# Patient Record
Sex: FEMALE | Race: WHITE
[De-identification: names, ages, dates, MRNs, and addresses within clinical notes are randomized per-mention and may not be internally consistent; named-entity substitution may affect disease eponyms.]

---

## 2023-04-13 ENCOUNTER — HOSPITAL ENCOUNTER (EMERGENCY)
Dept: HOSPITAL 47 - EC | Age: 83
Discharge: HOME | End: 2023-04-13
Payer: MEDICARE

## 2023-04-13 VITALS — RESPIRATION RATE: 18 BRPM | DIASTOLIC BLOOD PRESSURE: 95 MMHG | HEART RATE: 74 BPM | SYSTOLIC BLOOD PRESSURE: 146 MMHG

## 2023-04-13 VITALS — TEMPERATURE: 97.1 F

## 2023-04-13 DIAGNOSIS — K40.90: ICD-10-CM

## 2023-04-13 DIAGNOSIS — N39.0: Primary | ICD-10-CM

## 2023-04-13 LAB
ALBUMIN SERPL-MCNC: 4.5 G/DL (ref 3.5–5)
ALP SERPL-CCNC: 72 U/L (ref 38–126)
ALT SERPL-CCNC: 22 U/L (ref 4–34)
ANION GAP SERPL CALC-SCNC: 11 MMOL/L
APTT BLD: 23 SEC (ref 22–30)
AST SERPL-CCNC: 25 U/L (ref 14–36)
BASOPHILS # BLD AUTO: 0 K/UL (ref 0–0.2)
BASOPHILS NFR BLD AUTO: 0 %
BUN SERPL-SCNC: 11 MG/DL (ref 7–17)
CALCIUM SPEC-MCNC: 9.9 MG/DL (ref 8.4–10.2)
CHLORIDE SERPL-SCNC: 107 MMOL/L (ref 98–107)
CO2 SERPL-SCNC: 23 MMOL/L (ref 22–30)
EOSINOPHIL # BLD AUTO: 0 K/UL (ref 0–0.7)
EOSINOPHIL NFR BLD AUTO: 1 %
ERYTHROCYTE [DISTWIDTH] IN BLOOD BY AUTOMATED COUNT: 4.82 M/UL (ref 3.8–5.4)
ERYTHROCYTE [DISTWIDTH] IN BLOOD: 12.5 % (ref 11.5–15.5)
GLUCOSE SERPL-MCNC: 112 MG/DL (ref 74–99)
HCT VFR BLD AUTO: 43 % (ref 34–46)
HGB BLD-MCNC: 14.8 GM/DL (ref 11.4–16)
INR PPP: 1.1 (ref ?–1.2)
KETONES UR QL STRIP.AUTO: (no result)
LYMPHOCYTES # SPEC AUTO: 0.7 K/UL (ref 1–4.8)
LYMPHOCYTES NFR SPEC AUTO: 9 %
MAGNESIUM SPEC-SCNC: 2.1 MG/DL (ref 1.6–2.3)
MCH RBC QN AUTO: 30.8 PG (ref 25–35)
MCHC RBC AUTO-ENTMCNC: 34.5 G/DL (ref 31–37)
MCV RBC AUTO: 89.3 FL (ref 80–100)
MONOCYTES # BLD AUTO: 0.4 K/UL (ref 0–1)
MONOCYTES NFR BLD AUTO: 5 %
NEUTROPHILS # BLD AUTO: 6.5 K/UL (ref 1.3–7.7)
NEUTROPHILS NFR BLD AUTO: 84 %
PH UR: 8 [PH] (ref 5–8)
PLATELET # BLD AUTO: 200 K/UL (ref 150–450)
POTASSIUM SERPL-SCNC: 3.9 MMOL/L (ref 3.5–5.1)
PROT SERPL-MCNC: 7.6 G/DL (ref 6.3–8.2)
PT BLD: 11.2 SEC (ref 9–12)
RBC UR QL: 6 /HPF (ref 0–5)
SODIUM SERPL-SCNC: 141 MMOL/L (ref 137–145)
SP GR UR: 1.01 (ref 1–1.03)
SQUAMOUS UR QL AUTO: 16 /HPF (ref 0–4)
UROBILINOGEN UR QL STRIP: <2 MG/DL (ref ?–2)
WBC # BLD AUTO: 7.8 K/UL (ref 3.8–10.6)
WBC #/AREA URNS HPF: >182 /HPF (ref 0–5)

## 2023-04-13 PROCEDURE — 85610 PROTHROMBIN TIME: CPT

## 2023-04-13 PROCEDURE — 85730 THROMBOPLASTIN TIME PARTIAL: CPT

## 2023-04-13 PROCEDURE — 80053 COMPREHEN METABOLIC PANEL: CPT

## 2023-04-13 PROCEDURE — 83735 ASSAY OF MAGNESIUM: CPT

## 2023-04-13 PROCEDURE — 83605 ASSAY OF LACTIC ACID: CPT

## 2023-04-13 PROCEDURE — 96374 THER/PROPH/DIAG INJ IV PUSH: CPT

## 2023-04-13 PROCEDURE — 84484 ASSAY OF TROPONIN QUANT: CPT

## 2023-04-13 PROCEDURE — 85025 COMPLETE CBC W/AUTO DIFF WBC: CPT

## 2023-04-13 PROCEDURE — 93005 ELECTROCARDIOGRAM TRACING: CPT

## 2023-04-13 PROCEDURE — 36415 COLL VENOUS BLD VENIPUNCTURE: CPT

## 2023-04-13 PROCEDURE — 74177 CT ABD & PELVIS W/CONTRAST: CPT

## 2023-04-13 PROCEDURE — 99285 EMERGENCY DEPT VISIT HI MDM: CPT

## 2023-04-13 PROCEDURE — 81001 URINALYSIS AUTO W/SCOPE: CPT

## 2023-04-13 PROCEDURE — 71046 X-RAY EXAM CHEST 2 VIEWS: CPT

## 2023-04-13 RX ADMIN — CEFTRIAXONE SODIUM STA MG: 1 INJECTION, POWDER, FOR SOLUTION INTRAMUSCULAR; INTRAVENOUS at 17:36

## 2023-04-13 NOTE — CT
EXAMINATION TYPE: CT abdomen pelvis w con

 

DATE OF EXAM: 4/13/2023

 

COMPARISON: None

 

HISTORY: pt states she is here for SOB

 

CT DLP: 1062.8 mGycm

 

CONTRAST: 

CT scan of the abdomen and pelvis is performed without Oral Contrast and with IV Contrast, patient in
jected with 80cc mL of Isovue 300.

 

FINDINGS: 

LUNG BASES-: No visible nodule.  No infiltrate. There is evidence of cardiomegaly.

 

LIVER/GB:   No calcified gallstones.  No space occupying hepatic lesion. Biliary tree is of normal ca
liber. 

 

PANCREAS:  No inflammation.  No distinct mass. 

 

SPLEEN:  No splenic enlargement.  No lesion seen. 

 

ADRENALS:  No nodule.  No thickening. 

 

KIDNEYS/BLADDER:  No hydronephrosis.  No nephrolithiasis.  No distinct renal mass.  Urinary bladder g
rossly unremarkable. 

 

BOWEL: Normal appendix.  Normal bowel caliber.  No inflammation. Scattered diverticulosis of the colo
n without diverticulitis.

 

GENITAL ORGANS:  No gross abnormality. 

 

LYMPH NODES: Enlarged left inguinal lymph node measuring nearly 2 cm short axis. AORTA: No significan
t abnormality. 

 

OSSEOUS STRUCTURES:  No significant abnormality is seen. 

 

OTHER: Right inguinal hernia containing fat and fluid measures 3.6 cm in length.

 

IMPRESSION: 

1. Right inguinal hernia containing fat and fluid measures 3.6 cm in length.

2. Left inguinal adenopathy.

## 2023-04-13 NOTE — XR
EXAMINATION TYPE: XR chest 2V

 

DATE OF EXAM: 4/13/2023

 

COMPARISON: NONE

 

HISTORY: Shortness of breath

 

TECHNIQUE:  Frontal and lateral views of the chest are obtained.

 

FINDINGS:

 

Scattered senescent parenchymal changes noted. Hyperinflation compatible with COPD. 

 

No evidence for infiltrate. No evidence for atelectasis.

 

Heart size is stable.

 

Mediastinal structures are stable and grossly unremarkable.

 

No evidence for hilar prominence.

 

Degenerative changes dorsal spine. 

 

IMPRESSION:

1. No evidence for acute pulmonary disease.

## 2023-04-13 NOTE — ED
General Adult HPI





- General


Chief complaint: Shortness of Breath


Stated complaint: SOB


Time Seen by Provider: 04/13/23 14:20


Source: patient, EMS, RN notes reviewed, old records reviewed


Mode of arrival: EMS


Limitations: no limitations





- History of Present Illness


Initial comments: 





This is a 82-year-old female presents emergency Department with some minor 

dementia but she is not a very good historian fell whole history comes from the 

daughter and .  Patient went to an urgent care this morning she was 

complaining of some right lower quadrant abdominal pain and some shortness of 

breath.  When they got there it was determined that she needs to come to the 

emergency department I did not receive any phone call as to why she was coming. 

Patient complains now of having area that bulges out her abdomen hurts to touch.

 Patient also complains of having urinary incontinence.  Patient denies any 

fever chills or cough recently.  Patient denies any chest pain or palpitations. 

 states she initially complained of shortness of breath but she is not 

complaining of that currently.  Daughter states that the patient has been more 

and more incontinent of urine over the last month or 2 he has been wearing pads 

because of that today was a little excessive.





- Related Data


                                Home Medications











 Medication  Instructions  Recorded  Confirmed


 


Spironolactone [Aldactone] 25 mg PO DAILY 04/13/23 04/13/23








                                  Previous Rx's











 Medication  Instructions  Recorded


 


Sulfamethox-Tmp 800-160Mg [Bactrim 1 each PO Q12HR #14 tab 04/13/23





-160 mg]  











                                    Allergies











Allergy/AdvReac Type Severity Reaction Status Date / Time


 


No Known Allergies Allergy   Verified 04/13/23 16:25














Review of Systems


ROS Statement: 


Those systems with pertinent positive or pertinent negative responses have been 

documented in the HPI.





ROS Other: All systems not noted in ROS Statement are negative.





Past Medical History


Past Medical History: Unable to Obtain


History of Any Multi-Drug Resistant Organisms: None Reported


Past Surgical History: Unable to Obtain


Past Psychological History: No Psychological Hx Reported


Smoking Status: Never smoker


Past Alcohol Use History: Occasional


Past Drug Use History: None Reported





General Exam





- General Exam Comments


Initial Comments: 





GENERAL:


Patient is well-developed and well-nourished.  Patient is nontoxic and well-h

ydrated and is in mild distress.





ENT:


Neck is soft and supple.  No significant lymphadenopathy is noted.  Oropharynx 

is clear.  Moist mucous membranes.  Neck has full range of motion without 

eliciting any pain.  





EYES:


The sclera were anicteric and conjunctiva were pink and moist.  Extraocular mov

ements were intact and pupils were equal round and reactive to light.  Eyelids 

were unremarkable.





PULMONARY:


Unlabored respirations.  Good breath sounds bilaterally.  No audible rales 

rhonchi or wheezing was noted.





CARDIOVASCULAR:


Patient has irregularly irregular heartbeat





ABDOMEN:


Soft and nontender with normal bowel sounds.  Patient has what appears to be a 

ventral hernia on the right suprapubic region it does appear to be somewhat 

reducible but it is very tender.  Patient is incontinent of urine





SKIN:


Skin is clear with no lesions or rashes and otherwise unremarkable.





NEUROLOGIC:


Patient is alert and oriented x3.  Cranial nerves II through XII are grossly 

intact.  Motor and sensory are also intact.  Normal speech, volume and content. 

Symmetrical smile.  





MUSCULOSKELETAL:


Normal extremities with adequate strength and full range of motion.  





LYMPHATICS:


No significant lymphadenopathy is noted





PSYCHIATRIC:


Normal psychiatric evaluation.  


Limitations: no limitations





Course


                                   Vital Signs











  04/13/23 04/13/23





  14:23 17:33


 


Temperature 97.1 F L 


 


Pulse Rate 122 H 74


 


Respiratory 24 18





Rate  


 


Blood Pressure 172/106 146/95


 


O2 Sat by Pulse 99 97





Oximetry  














Medical Decision Making





- Medical Decision Making





EKG is interpreted by myself.  EKG shows atrial fibrillation at 94 bpm QRS is 93

QT interval 353 QTC is 404.  Patient's EKG shows no ST segment elevation.





Was pt. sent in by a medical professional or institution ([facility], NP, urgent 

care, hospital, or nursing home...) When possible be specific


@  -Patient was sent to us by urgent care


Did you speak to anyone other than the patient for history (EMS, parent, family,

police, friend...)? What history was obtained from this source 


@  -Gave most of the reliable history


Did you review nursing and triage notes (agree or disagree)?  Why? 


@  -I reviewed and agree with nursing and triage notes


Were old charts reviewed (outside hosp., previous admission, EMS record, old 

EKG, old radiological studies, urgent care reports/EKG's, nursing home records)?

Report findings 


@  -I reviewed prior laboratory results on this patient


Differential Diagnosis (chest pain, altered mental status, abdominal pain women,

abdominal pain men, vaginal bleeding, weakness, fever, dyspnea, syncope, 

headache, dizziness, GI bleed, back pain, seizure, CVA, palpatations, mental 

health, musculoskeletal)? 


@  -Differential Abdominal Pain Women:


Appendicitis, Cholecystitis, diverticulosis, ischemic bowel, pancreatitis, hepat

itis, UTI, gastroenteritis, AAA, incarcerated hernia, bowel obstruction, 

constipation, inflammatory bowel, hepatitis, peptic ulcer disease, splenic 

infarction, perforated viscus, vulvitis, ovarian torsion, PID, kidney stone, 

placenta abruption, this is not meant to be an all-inclusive list





EKG interpreted by me (3pts min.).


@  -As above


X-rays interpreted by me (1pt min.).


@  -Chest x-ray was interpreted by myself I no acute abnormality


CT interpreted by me (1pt min.).


@  -CT of the abdomen and pelvis was interpreted by myself.  It showed an 

inguinal hernia with some protruding fat no intestine was involved 


U/S interpreted by me (1pt. min.).


@  -None done


What testing was considered but not performed or refused? (CT, X-rays, U/S, 

labs)? Why?


@  -None


What meds were considered but not given or refused? Why?


@  -None


Did you discuss the management of the patient with other professionals 

(professionals i.e. , PA, NP, lab, RT, psych nurse, , , 

teacher, , )? Give summary


@  -No


Was smoking cessation discussed for >3mins.?


@  -No


Was critical care preformed (if so, how long)?


@  -No


Were there social determinants of health that impacted care today? How? 

(Homelessness, low income, unemployed, alcoholism, drug addiction, 

transportation, low edu. Level, literacy, decrease access to med. care, [facility], 

rehab)?


@  -No


Was there de-escalation of care discussed even if they declined (Discuss DNR or 

withdrawal of care, Hospice)? DNR status


@  -No


What co-morbidities impacted this encounter? (DM, HTN, Smoking, COPD, CAD, 

Cancer, CVA, ARF, Chemo, Hep., AIDS, mental health diagnosis, sleep apnea, 

morbid obesity)?


@  -None


Was patient admitted / discharged? Hospital course, mention meds given and 

route, prescriptions, significant lab abnormalities, going to OR and other 

pertinent info.


@  -Patient did have an inguinal hernia and it was reducible.  Patient had a 

urinary tract infection I gave her 2 g of Rocephin in the hospital and she'll be

discharged home with antibiotics


Undiagnosed new problem with uncertain prognosis?


@  -No


Drug Therapy requiring intensive monitoring for toxicity (Heparin, Nitro, 

Insulin, Cardizem)?


@  -No


Were any procedures done?


@  -No


Diagnosis/symptom?


@  -Urinary tract infection


Acute, or Chronic, or Acute on Chronic?


@  -Acute


Uncomplicated (without systemic symptoms) or Complicated (systemic symptoms)?


@  -Complicated


Side effects of treatment?


@  -No


Exacerbation, Progression, or Severe Exacerbation?


@  -No


Poses a threat to life or bodily function? How? (Chest pain, USA, MI, pneumonia,

PE, COPD, DKA, ARF, appy, cholecystitis, CVA, Diverticulitis, Homicidal, 

Suicidal, threat to staff... and all critical care pts)


@  -Yes this could lead to sepsis and in no organ dysfunction


Diagnosis/symptom?


@  -Inguinal hernia reducible


Acute, or Chronic, or Acute on Chronic?


@  -Acute on chronic


Uncomplicated (without systemic symptoms) or Complicated (systemic symptoms)?


@  -Uncomplicated


Side effects of treatment?


@  -none


Exacerbation, Progression, or Severe Exacerbation]


@  -no


Poses a threat to life or bodily function?


@  -no





- Lab Data


Result diagrams: 


                                 04/13/23 15:13





                                 04/13/23 15:13


                                   Lab Results











  04/13/23 04/13/23 04/13/23 Range/Units





  15:13 15:13 15:13 


 


WBC  7.8    (3.8-10.6)  k/uL


 


RBC  4.82    (3.80-5.40)  m/uL


 


Hgb  14.8    (11.4-16.0)  gm/dL


 


Hct  43.0    (34.0-46.0)  %


 


MCV  89.3    (80.0-100.0)  fL


 


MCH  30.8    (25.0-35.0)  pg


 


MCHC  34.5    (31.0-37.0)  g/dL


 


RDW  12.5    (11.5-15.5)  %


 


Plt Count  200    (150-450)  k/uL


 


MPV  6.9    


 


Neutrophils %  84    %


 


Lymphocytes %  9    %


 


Monocytes %  5    %


 


Eosinophils %  1    %


 


Basophils %  0    %


 


Neutrophils #  6.5    (1.3-7.7)  k/uL


 


Lymphocytes #  0.7 L    (1.0-4.8)  k/uL


 


Monocytes #  0.4    (0-1.0)  k/uL


 


Eosinophils #  0.0    (0-0.7)  k/uL


 


Basophils #  0.0    (0-0.2)  k/uL


 


PT   11.2   (9.0-12.0)  sec


 


INR   1.1   (<1.2)  


 


APTT   23.0   (22.0-30.0)  sec


 


Sodium    141  (137-145)  mmol/L


 


Potassium    3.9  (3.5-5.1)  mmol/L


 


Chloride    107  ()  mmol/L


 


Carbon Dioxide    23  (22-30)  mmol/L


 


Anion Gap    11  mmol/L


 


BUN    11  (7-17)  mg/dL


 


Creatinine    0.63  (0.52-1.04)  mg/dL


 


Est GFR (CKD-EPI)AfAm    >90  (>60 ml/min/1.73 sqM)  


 


Est GFR (CKD-EPI)NonAf    84  (>60 ml/min/1.73 sqM)  


 


Glucose    112 H  (74-99)  mg/dL


 


Plasma Lactic Acid Cassius     (0.7-2.0)  mmol/L


 


Calcium    9.9  (8.4-10.2)  mg/dL


 


Magnesium    2.1  (1.6-2.3)  mg/dL


 


Total Bilirubin    1.7 H  (0.2-1.3)  mg/dL


 


AST    25  (14-36)  U/L


 


ALT    22  (4-34)  U/L


 


Alkaline Phosphatase    72  ()  U/L


 


Troponin I     (0.000-0.034)  ng/mL


 


Total Protein    7.6  (6.3-8.2)  g/dL


 


Albumin    4.5  (3.5-5.0)  g/dL


 


Urine Color     


 


Urine Appearance     (Clear)  


 


Urine pH     (5.0-8.0)  


 


Ur Specific Gravity     (1.001-1.035)  


 


Urine Protein     (Negative)  


 


Urine Glucose (UA)     (Negative)  


 


Urine Ketones     (Negative)  


 


Urine Blood     (Negative)  


 


Urine Nitrite     (Negative)  


 


Urine Bilirubin     (Negative)  


 


Urine Urobilinogen     (<2.0)  mg/dL


 


Ur Leukocyte Esterase     (Negative)  


 


Urine RBC     (0-5)  /hpf


 


Urine WBC     (0-5)  /hpf


 


Urine WBC Clumps     (None)  /hpf


 


Ur Squamous Epith Cells     (0-4)  /hpf


 


Urine Bacteria     (None)  /hpf














  04/13/23 04/13/23 04/13/23 Range/Units





  15:13 15:13 16:05 


 


WBC     (3.8-10.6)  k/uL


 


RBC     (3.80-5.40)  m/uL


 


Hgb     (11.4-16.0)  gm/dL


 


Hct     (34.0-46.0)  %


 


MCV     (80.0-100.0)  fL


 


MCH     (25.0-35.0)  pg


 


MCHC     (31.0-37.0)  g/dL


 


RDW     (11.5-15.5)  %


 


Plt Count     (150-450)  k/uL


 


MPV     


 


Neutrophils %     %


 


Lymphocytes %     %


 


Monocytes %     %


 


Eosinophils %     %


 


Basophils %     %


 


Neutrophils #     (1.3-7.7)  k/uL


 


Lymphocytes #     (1.0-4.8)  k/uL


 


Monocytes #     (0-1.0)  k/uL


 


Eosinophils #     (0-0.7)  k/uL


 


Basophils #     (0-0.2)  k/uL


 


PT     (9.0-12.0)  sec


 


INR     (<1.2)  


 


APTT     (22.0-30.0)  sec


 


Sodium     (137-145)  mmol/L


 


Potassium     (3.5-5.1)  mmol/L


 


Chloride     ()  mmol/L


 


Carbon Dioxide     (22-30)  mmol/L


 


Anion Gap     mmol/L


 


BUN     (7-17)  mg/dL


 


Creatinine     (0.52-1.04)  mg/dL


 


Est GFR (CKD-EPI)AfAm     (>60 ml/min/1.73 sqM)  


 


Est GFR (CKD-EPI)NonAf     (>60 ml/min/1.73 sqM)  


 


Glucose     (74-99)  mg/dL


 


Plasma Lactic Acid Cassisu  1.9    (0.7-2.0)  mmol/L


 


Calcium     (8.4-10.2)  mg/dL


 


Magnesium     (1.6-2.3)  mg/dL


 


Total Bilirubin     (0.2-1.3)  mg/dL


 


AST     (14-36)  U/L


 


ALT     (4-34)  U/L


 


Alkaline Phosphatase     ()  U/L


 


Troponin I   <0.012   (0.000-0.034)  ng/mL


 


Total Protein     (6.3-8.2)  g/dL


 


Albumin     (3.5-5.0)  g/dL


 


Urine Color    Light Yellow  


 


Urine Appearance    Cloudy H  (Clear)  


 


Urine pH    8.0  (5.0-8.0)  


 


Ur Specific Gravity    1.007  (1.001-1.035)  


 


Urine Protein    Negative  (Negative)  


 


Urine Glucose (UA)    Negative  (Negative)  


 


Urine Ketones    1+ H  (Negative)  


 


Urine Blood    Small H  (Negative)  


 


Urine Nitrite    Negative  (Negative)  


 


Urine Bilirubin    Negative  (Negative)  


 


Urine Urobilinogen    <2.0  (<2.0)  mg/dL


 


Ur Leukocyte Esterase    Large H  (Negative)  


 


Urine RBC    6 H  (0-5)  /hpf


 


Urine WBC    >182 H  (0-5)  /hpf


 


Urine WBC Clumps    Few H  (None)  /hpf


 


Ur Squamous Epith Cells    16 H  (0-4)  /hpf


 


Urine Bacteria    Rare H  (None)  /hpf














Disposition


Clinical Impression: 


 Urinary tract infection, Inguinal hernia





Disposition: HOME SELF-CARE


Condition: Good


Instructions (If sedation given, give patient instructions):  Urinary Tract 

Infection in Women (ED)


Prescriptions: 


Sulfamethox-Tmp 800-160Mg [Bactrim -160 mg] 1 each PO Q12HR #14 tab


Is patient prescribed a controlled substance at d/c from ED?: No


Referrals: 


Nonstaff,Physician [REFERRING] - 1-2 days


Time of Disposition: 18:13

## 2023-08-18 ENCOUNTER — HOSPITAL ENCOUNTER (EMERGENCY)
Dept: HOSPITAL 47 - EC | Age: 83
Discharge: HOME | End: 2023-08-18
Payer: MEDICARE

## 2023-08-18 VITALS — HEART RATE: 86 BPM | RESPIRATION RATE: 16 BRPM | DIASTOLIC BLOOD PRESSURE: 85 MMHG | SYSTOLIC BLOOD PRESSURE: 127 MMHG

## 2023-08-18 VITALS — TEMPERATURE: 97.2 F
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
## 2023-08-18 DIAGNOSIS — N39.0: ICD-10-CM

## 2023-08-18 DIAGNOSIS — R07.89: Primary | ICD-10-CM

## 2023-08-18 DIAGNOSIS — I10: ICD-10-CM

## 2023-08-18 DIAGNOSIS — Z79.899: ICD-10-CM

## 2023-08-18 DIAGNOSIS — I48.91: ICD-10-CM

## 2023-08-18 DIAGNOSIS — Z79.01: ICD-10-CM

## 2023-08-18 LAB
ALBUMIN SERPL-MCNC: 3.8 G/DL (ref 3.5–5)
ALP SERPL-CCNC: 55 U/L (ref 38–126)
ALT SERPL-CCNC: 21 U/L (ref 4–34)
ANION GAP SERPL CALC-SCNC: 7 MMOL/L
APTT BLD: 25 SEC (ref 22–30)
AST SERPL-CCNC: 33 U/L (ref 14–36)
BASOPHILS # BLD AUTO: 0 K/UL (ref 0–0.2)
BASOPHILS NFR BLD AUTO: 0 %
BUN SERPL-SCNC: 12 MG/DL (ref 7–17)
CALCIUM SPEC-MCNC: 8.7 MG/DL (ref 8.4–10.2)
CHLORIDE SERPL-SCNC: 98 MMOL/L (ref 98–107)
CO2 SERPL-SCNC: 30 MMOL/L (ref 22–30)
EOSINOPHIL # BLD AUTO: 0.1 K/UL (ref 0–0.7)
EOSINOPHIL NFR BLD AUTO: 1 %
ERYTHROCYTE [DISTWIDTH] IN BLOOD BY AUTOMATED COUNT: 4.6 M/UL (ref 3.8–5.4)
ERYTHROCYTE [DISTWIDTH] IN BLOOD: 12.2 % (ref 11.5–15.5)
GLUCOSE SERPL-MCNC: 121 MG/DL (ref 74–99)
HCT VFR BLD AUTO: 41.8 % (ref 34–46)
HGB BLD-MCNC: 14.5 GM/DL (ref 11.4–16)
INR PPP: 1.1 (ref ?–1.2)
LIPASE SERPL-CCNC: 103 U/L (ref 23–300)
LYMPHOCYTES # SPEC AUTO: 0.8 K/UL (ref 1–4.8)
LYMPHOCYTES NFR SPEC AUTO: 8 %
MAGNESIUM SPEC-SCNC: 1.8 MG/DL (ref 1.6–2.3)
MCH RBC QN AUTO: 31.7 PG (ref 25–35)
MCHC RBC AUTO-ENTMCNC: 34.8 G/DL (ref 31–37)
MCV RBC AUTO: 90.9 FL (ref 80–100)
MONOCYTES # BLD AUTO: 0.6 K/UL (ref 0–1)
MONOCYTES NFR BLD AUTO: 6 %
NEUTROPHILS # BLD AUTO: 8.1 K/UL (ref 1.3–7.7)
NEUTROPHILS NFR BLD AUTO: 83 %
PH UR: 6 [PH] (ref 5–8)
PLATELET # BLD AUTO: 160 K/UL (ref 150–450)
POTASSIUM SERPL-SCNC: 3.4 MMOL/L (ref 3.5–5.1)
PROT SERPL-MCNC: 6.9 G/DL (ref 6.3–8.2)
PT BLD: 11.5 SEC (ref 9–12)
RBC UR QL: 37 /HPF (ref 0–5)
SODIUM SERPL-SCNC: 135 MMOL/L (ref 137–145)
SP GR UR: 1.03 (ref 1–1.03)
SQUAMOUS UR QL AUTO: 6 /HPF (ref 0–4)
UROBILINOGEN UR QL STRIP: 6 MG/DL (ref ?–2)
WBC # BLD AUTO: 9.8 K/UL (ref 3.8–10.6)
WBC #/AREA URNS HPF: >182 /HPF (ref 0–5)

## 2023-08-18 PROCEDURE — 84484 ASSAY OF TROPONIN QUANT: CPT

## 2023-08-18 PROCEDURE — 83735 ASSAY OF MAGNESIUM: CPT

## 2023-08-18 PROCEDURE — 87086 URINE CULTURE/COLONY COUNT: CPT

## 2023-08-18 PROCEDURE — 71260 CT THORAX DX C+: CPT

## 2023-08-18 PROCEDURE — 83690 ASSAY OF LIPASE: CPT

## 2023-08-18 PROCEDURE — 85730 THROMBOPLASTIN TIME PARTIAL: CPT

## 2023-08-18 PROCEDURE — 85025 COMPLETE CBC W/AUTO DIFF WBC: CPT

## 2023-08-18 PROCEDURE — 99284 EMERGENCY DEPT VISIT MOD MDM: CPT

## 2023-08-18 PROCEDURE — 81001 URINALYSIS AUTO W/SCOPE: CPT

## 2023-08-18 PROCEDURE — 80053 COMPREHEN METABOLIC PANEL: CPT

## 2023-08-18 PROCEDURE — 85610 PROTHROMBIN TIME: CPT

## 2023-08-18 PROCEDURE — 93005 ELECTROCARDIOGRAM TRACING: CPT

## 2023-08-18 PROCEDURE — 36415 COLL VENOUS BLD VENIPUNCTURE: CPT

## 2023-08-18 PROCEDURE — 74177 CT ABD & PELVIS W/CONTRAST: CPT

## 2023-08-18 RX ADMIN — CEPHALEXIN STA MG: 250 CAPSULE ORAL at 13:29

## 2023-08-18 RX ADMIN — CEPHALEXIN STA MG: 250 CAPSULE ORAL at 13:27

## 2023-08-18 NOTE — ED
Fall HPI





- General


Chief Complaint: Fall


Stated Complaint: Fall, chest pain from fall, no thinners


Time Seen by Provider: 08/18/23 10:12


Source: patient


Mode of arrival: wheelchair





- History of Present Illness


Initial Comments: 


Patient is an 83-year-old female who presents the emergency department for fall.

 Patient had an unwitnessed fall 2 days ago.  Patient does not remember details 

about the fall.  She is unsure if she hit her head.  Unsure of blood thinner 

use.  She denies headache, blurry vision.  Does have some mild neck pain on the 

right side which she states is almost resolved.  Patient reports pain in her 

lower middle chest.  She does not have pain at rest but does have increased pain

when she bends forward.  She denies shortness of breath.  Denies numbness and 

tingling.  No nausea or vomiting.








- Related Data


                                Home Medications











 Medication  Instructions  Recorded  Confirmed


 


Apixaban [Eliquis] 5 mg PO BID 06/19/23 08/18/23


 


Atorvastatin [Lipitor] 20 mg PO HS 06/19/23 08/18/23


 


Metoprolol Succinate (ER) [Toprol 50 mg PO DAILY 06/19/23 08/18/23





Xl]   


 


Donepezil [Aricept] 5 mg PO HS 08/18/23 08/18/23


 


Nitrofurantoin Monohyd/M-Cryst 100 mg PO AS DIRECTED 08/18/23 08/18/23





[Macrobid]   








                                  Previous Rx's











 Medication  Instructions  Recorded


 


Cephalexin [Keflex] 250 mg PO Q6HR #20 cap 08/18/23


 


Ibuprofen [Motrin] 600 mg PO Q8HR PRN #30 tab 08/18/23











                                    Allergies











Allergy/AdvReac Type Severity Reaction Status Date / Time


 


No Known Allergies Allergy   Verified 08/18/23 13:10














Review of Systems


ROS Statement: 


Those systems with pertinent positive or pertinent negative responses have been 

documented in the HPI.





ROS Other: All systems not noted in ROS Statement are negative.





Past Medical History


Past Medical History: Atrial Fibrillation, Dementia, Hypertension


Additional Past Medical History / Comment(s): pt vague -poor historian.,- 

daughter states some dementia., hx uti"s, urine leakage- wears pads., right 

inguinal hernia


History of Any Multi-Drug Resistant Organisms: None Reported


Past Surgical History: Cholecystectomy, Orthopedic Surgery


Additional Past Surgical History / Comment(s): knee surgery


Past Anesthesia/Blood Transfusion Reactions: No Reported Reaction


Additional Past Anesthesia/Blood Transfusion Reaction / Comment(s): daughter = 

ponv


Past Psychological History: No Psychological Hx Reported


Smoking Status: Never smoker


Past Alcohol Use History: None Reported


Past Drug Use History: None Reported





- Past Family History


  ** Mother


Family Medical History: No Reported History





General Exam


Limitations: no limitations


General appearance: alert


Head exam: Present: atraumatic, normocephalic, normal inspection


Eye exam: Present: normal appearance, PERRL, EOMI.  Absent: scleral icterus, 

conjunctival injection, periorbital swelling


ENT exam: Present: TM's normal bilaterally


Neck exam: Present: normal inspection, tenderness (Right sternocleidomastoid), 

full ROM


Respiratory exam: Present: normal lung sounds bilaterally.  Absent: respiratory 

distress, wheezes, rales, rhonchi, stridor, chest wall tenderness


Cardiovascular Exam: Present: regular rate, normal rhythm, normal heart sounds. 

 Absent: systolic murmur, diastolic murmur, rubs, gallop, clicks


GI/Abdominal exam: Present: soft, normal bowel sounds.  Absent: distended, 

tenderness, guarding, rebound, rigid


Back exam: Present: normal inspection, full ROM.  Absent: tenderness, paraspinal

 tenderness, vertebral tenderness


Neurological exam: Present: alert


  ** Expanded


Sensory exam: Upper Extremity Light Touch: Normal, Lower Extremity Light Touch: 

Normal


Motor strength exam: RUE: 5, LUE: 5, RLE: 5, LLE: 5


Psychiatric exam: Present: normal affect, normal mood


Skin exam: Present: warm, dry, intact, normal color.  Absent: rash





Course


                                   Vital Signs











  08/18/23 08/18/23





  10:04 13:19


 


Temperature 97.2 F L 


 


Pulse Rate 76 86


 


Respiratory 18 16





Rate  


 


Blood Pressure 126/75 127/85


 


O2 Sat by Pulse 98 98





Oximetry  














Medical Decision Making





- Medical Decision Making


EKG taken at 10:50, interpreted by myself


Atrial fibrillation


Ventricular rate 68, QRS duration 92, 





Was pt. sent in by a medical professional or institution (, PA, NP, urgent 

care, hospital, or nursing home...) When possible be specific


@  -No


Did you speak to anyone other than the patient for history (EMS, parent, family,

 police, friend...)? What history was obtained from this source 


@  -No


Did you review nursing and triage notes (agree or disagree)?  Why? 


@  -I reviewed and agree with nursing and triage notes


Were old charts reviewed (outside hosp., previous admission, EMS record, old 

EKG, old radiological studies, urgent care reports/EKG's, nursing home records)?

 Report findings 


@  -No old charts were reviewed


Differential Diagnosis (chest pain, altered mental status, abdominal pain women,

 abdominal pain men, vaginal bleeding, weakness, fever, dyspnea, syncope, 

headache, dizziness, GI bleed, back pain, seizure, CVA, palpatations, mental 

health)? 


@  -Differential Chest Pain:


Stable Angina, Unstable Angina, STEMI, NSTEMI Aortic Dissection, Pneumothorax, 

Musculoskeletal, Esophageal Spasm GERD, Cholecystitis, Pancreatitis, Zoster, 

this is not meant to be an all-inclusive list. 


EKG interpreted by me (3pts min.).


@  -As above


X-rays interpreted by me (1pt min.).


@  -None done


CT interpreted by me (1pt min.).


@  -No acute posttraumatic changes of the chest abdomen or pelvis.  Small 

peripheral nodule in the right upper lung field, follow-up CT of the chest in 6 

months recommended.  1.9 cm adnexal cyst


U/S interpreted by me (1pt. min.).


@  -None done


What testing was considered but not performed or refused? (CT, X-rays, U/S, 

labs)? Why?


@Patient declined CT of the brain and C-spine.  States her head does not hurt


What meds were considered but not given or refused? Why?


@  -None


Did you discuss the management of the patient with other professionals 

(professionals i.e. , PA, NP, lab, RT, psych nurse, , , 

teacher, , )? Give summary


@  -No


Was smoking cessation discussed for >3mins.?


@  -No


Was critical care preformed (if so, how long)?


@  -No


Were there social determinants of health that impacted care today? How? 

(Homelessness, low income, unemployed, alcoholism, drug addiction, 

transportation, low edu. Level, literacy, decrease access to med. care, prison, 

rehab)?


@  -No


Was there de-escalation of care discussed even if they declined (Discuss DNR or 

withdrawal of care, Hospice)? DNR status


@  -No


What co-morbidities impacted this encounter? (DM, HTN, Smoking, COPD, CAD, 

Cancer, CVA, ARF, Chemo, Hep., AIDS, mental health diagnosis, sleep apnea, 

morbid obesity)?


@  -None


Was patient admitted / discharged? Hospital course, mention meds given and 

route, prescriptions, significant lab abnormalities, going to OR and other 

pertinent info.


@  -Patient presenting with chest pain after fall.  Patient declined CT of the 

brain and C-spine.  She does not have chest wall tenderness with deep palpation.

  Because of this there is concern for other acute process.  Patient agreeable 

to CT of the chest abdomen and pelvis.  This was obtained and interpreted by 

myself showing no acute posttraumatic changes.  It did show small peripheral 

nodule in the right upper lung field.





EKG shows no evidence of acute ischemia.  Troponin within normal limits.  At 

this time etiology of pain is unknown.  Possibly musculoskeletal as it is mostly

 influenced by movement.  Patient did have mild hypokalemia at 3.4.  Potassium 

was replenished.  She is encouraged to increase oral/laboratory intake. 

Urinalysis does show > 182 WBCs, 37 RBCs, large leukocyte esterase.  Recent fall

 could be contributing to urinary tract infection.  Patient will be treated with

 Keflex.  We discussed incidental findings on CT scan and results.  Patient to 

follow up with primary care provider


Undiagnosed new problem with uncertain prognosis?


@  -No


Drug Therapy requiring intensive monitoring for toxicity (Heparin, Nitro, 

Insulin, Cardizem)?


@  -No


Were any procedures done?


@  -No]


Diagnosis/symptom?


@  -fall, atypical 


Acute, or Chronic, or Acute on Chronic?


@  acute


Uncomplicated (without systemic symptoms) or Complicated (systemic symptoms)?


@  -uncomplicated


Side effects of treatment?


@  -[No]


Exacerbation, Progression, or Severe Exacerbation?


@  -[No]


Poses a threat to life or bodily function? How? (Chest pain, USA, MI, pneumonia,

 PE, COPD, DKA, ARF, appy, cholecystitis, CVA, Diverticulitis, Homicidal, 

Suicidal, threat to staff... and all critical care pts)


@  -[No]








Dr. Vasquez is my attending 





- Lab Data


Result diagrams: 


                                 08/18/23 10:50





                                 08/18/23 10:50


                                   Lab Results











  08/18/23 08/18/23 08/18/23 Range/Units





  10:50 10:50 10:50 


 


WBC  9.8    (3.8-10.6)  k/uL


 


RBC  4.60    (3.80-5.40)  m/uL


 


Hgb  14.5    (11.4-16.0)  gm/dL


 


Hct  41.8    (34.0-46.0)  %


 


MCV  90.9    (80.0-100.0)  fL


 


MCH  31.7    (25.0-35.0)  pg


 


MCHC  34.8    (31.0-37.0)  g/dL


 


RDW  12.2    (11.5-15.5)  %


 


Plt Count  160    (150-450)  k/uL


 


MPV  7.5    


 


Neutrophils %  83    %


 


Lymphocytes %  8    %


 


Monocytes %  6    %


 


Eosinophils %  1    %


 


Basophils %  0    %


 


Neutrophils #  8.1 H    (1.3-7.7)  k/uL


 


Lymphocytes #  0.8 L    (1.0-4.8)  k/uL


 


Monocytes #  0.6    (0-1.0)  k/uL


 


Eosinophils #  0.1    (0-0.7)  k/uL


 


Basophils #  0.0    (0-0.2)  k/uL


 


PT   11.5   (9.0-12.0)  sec


 


INR   1.1   (<1.2)  


 


APTT   25.0   (22.0-30.0)  sec


 


Sodium    135 L  (137-145)  mmol/L


 


Potassium    3.4 L  (3.5-5.1)  mmol/L


 


Chloride    98  ()  mmol/L


 


Carbon Dioxide    30  (22-30)  mmol/L


 


Anion Gap    7  mmol/L


 


BUN    12  (7-17)  mg/dL


 


Creatinine    0.48 L  (0.52-1.04)  mg/dL


 


Est GFR (CKD-EPI)AfAm    >90  (>60 ml/min/1.73 sqM)  


 


Est GFR (CKD-EPI)NonAf    >90  (>60 ml/min/1.73 sqM)  


 


Glucose    121 H  (74-99)  mg/dL


 


Calcium    8.7  (8.4-10.2)  mg/dL


 


Magnesium    1.8  (1.6-2.3)  mg/dL


 


Total Bilirubin    1.5 H  (0.2-1.3)  mg/dL


 


AST    33  (14-36)  U/L


 


ALT    21  (4-34)  U/L


 


Alkaline Phosphatase    55  ()  U/L


 


Troponin I     (0.000-0.034)  ng/mL


 


Total Protein    6.9  (6.3-8.2)  g/dL


 


Albumin    3.8  (3.5-5.0)  g/dL


 


Lipase    103  ()  U/L


 


Urine Color     


 


Urine Appearance     (Clear)  


 


Urine pH     (5.0-8.0)  


 


Ur Specific Gravity     (1.001-1.035)  


 


Urine Protein     (Negative)  


 


Urine Glucose (UA)     (Negative)  


 


Urine Ketones     (Negative)  


 


Urine Blood     (Negative)  


 


Urine Nitrite     (Negative)  


 


Urine Bilirubin     (Negative)  


 


Urine Urobilinogen     (<2.0)  mg/dL


 


Ur Leukocyte Esterase     (Negative)  


 


Urine RBC     (0-5)  /hpf


 


Urine WBC     (0-5)  /hpf


 


Urine WBC Clumps     (None)  /hpf


 


Ur Squamous Epith Cells     (0-4)  /hpf


 


Amorphous Sediment     (None)  /hpf


 


Urine Mucus     (None)  /hpf














  08/18/23 08/18/23 Range/Units





  10:50 12:14 


 


WBC    (3.8-10.6)  k/uL


 


RBC    (3.80-5.40)  m/uL


 


Hgb    (11.4-16.0)  gm/dL


 


Hct    (34.0-46.0)  %


 


MCV    (80.0-100.0)  fL


 


MCH    (25.0-35.0)  pg


 


MCHC    (31.0-37.0)  g/dL


 


RDW    (11.5-15.5)  %


 


Plt Count    (150-450)  k/uL


 


MPV    


 


Neutrophils %    %


 


Lymphocytes %    %


 


Monocytes %    %


 


Eosinophils %    %


 


Basophils %    %


 


Neutrophils #    (1.3-7.7)  k/uL


 


Lymphocytes #    (1.0-4.8)  k/uL


 


Monocytes #    (0-1.0)  k/uL


 


Eosinophils #    (0-0.7)  k/uL


 


Basophils #    (0-0.2)  k/uL


 


PT    (9.0-12.0)  sec


 


INR    (<1.2)  


 


APTT    (22.0-30.0)  sec


 


Sodium    (137-145)  mmol/L


 


Potassium    (3.5-5.1)  mmol/L


 


Chloride    ()  mmol/L


 


Carbon Dioxide    (22-30)  mmol/L


 


Anion Gap    mmol/L


 


BUN    (7-17)  mg/dL


 


Creatinine    (0.52-1.04)  mg/dL


 


Est GFR (CKD-EPI)AfAm    (>60 ml/min/1.73 sqM)  


 


Est GFR (CKD-EPI)NonAf    (>60 ml/min/1.73 sqM)  


 


Glucose    (74-99)  mg/dL


 


Calcium    (8.4-10.2)  mg/dL


 


Magnesium    (1.6-2.3)  mg/dL


 


Total Bilirubin    (0.2-1.3)  mg/dL


 


AST    (14-36)  U/L


 


ALT    (4-34)  U/L


 


Alkaline Phosphatase    ()  U/L


 


Troponin I  <0.012   (0.000-0.034)  ng/mL


 


Total Protein    (6.3-8.2)  g/dL


 


Albumin    (3.5-5.0)  g/dL


 


Lipase    ()  U/L


 


Urine Color   Yellow  


 


Urine Appearance   Cloudy H  (Clear)  


 


Urine pH   6.0  (5.0-8.0)  


 


Ur Specific Gravity   1.027  (1.001-1.035)  


 


Urine Protein   Trace H  (Negative)  


 


Urine Glucose (UA)   Trace H  (Negative)  


 


Urine Ketones   Negative  (Negative)  


 


Urine Blood   Small H  (Negative)  


 


Urine Nitrite   Negative  (Negative)  


 


Urine Bilirubin   Negative  (Negative)  


 


Urine Urobilinogen   6.0  (<2.0)  mg/dL


 


Ur Leukocyte Esterase   Large H  (Negative)  


 


Urine RBC   37 H  (0-5)  /hpf


 


Urine WBC   >182 H  (0-5)  /hpf


 


Urine WBC Clumps   Rare H  (None)  /hpf


 


Ur Squamous Epith Cells   6 H  (0-4)  /hpf


 


Amorphous Sediment   Rare H  (None)  /hpf


 


Urine Mucus   Rare H  (None)  /hpf














Disposition


Clinical Impression: 


 Fall, Atypical chest pain, UTI (urinary tract infection)





Disposition: HOME SELF-CARE


Condition: Good


Instructions (If sedation given, give patient instructions):  Fall Prevention 

for Older Adults (ED)


Additional Instructions: 


Take medication as directed.  Take Tylenol or Motrin for pain.  Please follow-up

 with your primary care provider in 1-2 days.  Return to the emergency 

department if you experience new, concerning, or worsening symptoms.


Prescriptions: 


Cephalexin [Keflex] 250 mg PO Q6HR #20 cap


Ibuprofen [Motrin] 600 mg PO Q8HR PRN #30 tab


 PRN Reason: Pain


Is patient prescribed a controlled substance at d/c from ED?: No


Referrals: 


Mike Preciado MD [Primary Care Provider] - 1-2 days

## 2023-08-18 NOTE — CT
EXAMINATION TYPE: CT ChestAbdPelvis w con

 

DATE OF EXAM: 8/18/2023

 

INDICATION: Fall, Chest pain from fall x 2 days

 

COMPARISON: CT abdomen and pelvis 4/13/2023

 

CT DLP: 827.2 mGycm

 

CONTRAST: 

Performed without Oral Contrast and with IV Contrast, patient injected with 100 ml mL of Isovue 300.

 

TECHNIQUE: Axial images at 5 mm thick sections.  Reconstructed images in the coronal plane.  Delayed 
images through the kidneys.  

 

FINDINGS:

 

CT CHEST:

 

Portion of the thyroid visualized is normal.

 

There is a 0.3 cm nodule posterior medial right upper lung field. Image 14. Follow-up exam in 6 month
s is recommended. No pneumothorax is evident.

 

No enlarged mediastinal or hilar adenopathy is evident. 

 

The ascending aorta diameter at the level of the main pulmonary artery is 3.2 cm.  The main pulmonary
 artery diameter at the bifurcation is 2.5 cm.

 

No displaced rib fractures are identified.

 

CT ABDOMEN: No acute posttraumatic change is evident

 

Liver: Normal

 

Spleen: Normal

 

Pancreas: Normal

 

Adrenal glands: The adrenal glands are normal.

 

Gallbladder: Not identified.  

 

Kidneys: No masses are evident. No hydronephrosis is present.   No cysts are present.  Delayed images
 were obtained through the kidneys, which remain unremarkable.

 

Aorta: Vascular calcification is within the aorta. 

 

Inferior vena cava: Normal.

 

CT PELVIS: No free fluid is in the pelvis.

Loops of bowel within the abdomen and pelvis are normal.  Diverticular changes are within the sigmoid
 colon.   Study is without neural contrast limiting bowel evaluation.

 

Appendix: Normal as visualized.

 

Urinary bladder: Normal. 

 

Genitourinary structures: Uterus appears unremarkable. There appears to be a right adnexal cyst measu
ring 1.9 cm. Left adnexa is unremarkable.

 

Osseous structures: No suspicious lytic or sclerotic lesions. Facet degenerative changes are present 
within the lower lumbar spine.

 

IMPRESSIONS:

1. No acute posttraumatic changes chest abdomen or pelvis.

2. Small peripheral nodule right upper lung field, follow-up CT chest in 6 months is recommended.

3. 1.9 cm right adnexal cyst follow-up can be performed with ultrasound.